# Patient Record
Sex: MALE
[De-identification: names, ages, dates, MRNs, and addresses within clinical notes are randomized per-mention and may not be internally consistent; named-entity substitution may affect disease eponyms.]

---

## 2017-05-22 ENCOUNTER — HOSPITAL ENCOUNTER (EMERGENCY)
Dept: HOSPITAL 31 - C.ER | Age: 57
Discharge: HOME | End: 2017-05-22
Payer: COMMERCIAL

## 2017-05-22 VITALS
OXYGEN SATURATION: 94 % | HEART RATE: 86 BPM | DIASTOLIC BLOOD PRESSURE: 84 MMHG | SYSTOLIC BLOOD PRESSURE: 155 MMHG | RESPIRATION RATE: 18 BRPM

## 2017-05-22 VITALS — TEMPERATURE: 98.1 F
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
## 2017-05-22 DIAGNOSIS — S61.419A: Primary | ICD-10-CM

## 2017-05-22 NOTE — C.PDOC
History Of Present Illness


57 y/o M p/w laceration to hand suffered 4 hours ago. Patient states last 

tetanus within the last 10 years. He states he cleaned it out prior to arrival. 

Accidentally cut himself with a  like blade that was not particularly 

dirty. He states he only wants to make sure it does not get infected and he 

does not want stitches for it. He states he does not care if the scar is larger 

than if it had been sutured. 


Time Seen by Provider: 05/22/17 16:49


Chief Complaint (Nursing): Abnormal Skin Integrity





Past Medical History


Vital Signs: 





 Last Vital Signs











Temp  98.1 F   05/22/17 16:27


 


Pulse  58 L  05/22/17 16:27


 


Resp  20   05/22/17 16:27


 


BP  143/78   05/22/17 16:27


 


Pulse Ox  98   05/22/17 16:27











Family History: States: No Known Family Hx





- Social History


Hx Alcohol Use: Yes


Hx Substance Use: No





- Immunization History


Hx Tetanus Toxoid Vaccination: No (pt unsure)





Review Of Systems


Except As Marked, All Systems Reviewed And Found Negative.


Constitutional: Negative for: Fever


Respiratory: Negative for: Shortness of Breath





Physical Exam





- Physical Exam


Appears: No Acute Distress


Skin: Other (laceration, 2cm to thenar eminence, appears clean, no foreign 

bodies seen, no discharge, no bleeding.)


Extremity: No Tenderness (FROM of hand and digits.)





ED Course And Treatment


O2 Sat by Pulse Oximetry: 98





Medical Decision Making


Medical Decision Making: 


Tetanus up to date. Cleaned by tech in ER. No suturing performed as patient 

refused. Bacitracin topically, gauze, watch for signs of infection and if so, 

taken antibiotics to completion.





Disposition





- Disposition


Referrals: 


Denys Carty MD [Staff Provider] - 


Disposition: HOME/ ROUTINE


Disposition Time: 17:15


Condition: STABLE


Prescriptions: 


Amoxicillin/Clavulanate [Augmentin 875 MG-125 MG] 1 tab PO BID #20 tab


Bacitracin Ointment [Bacitracin] 1 appl TOP TID #1 tube


Instructions:  Laceration Without Closure (ED)





- Clinical Impression


Clinical Impression: 


 Laceration

## 2018-08-11 ENCOUNTER — HOSPITAL ENCOUNTER (EMERGENCY)
Dept: HOSPITAL 31 - C.ER | Age: 58
Discharge: HOME | End: 2018-08-11
Payer: COMMERCIAL

## 2018-08-11 VITALS — TEMPERATURE: 98.7 F | SYSTOLIC BLOOD PRESSURE: 132 MMHG | DIASTOLIC BLOOD PRESSURE: 79 MMHG | HEART RATE: 63 BPM

## 2018-08-11 VITALS — RESPIRATION RATE: 20 BRPM

## 2018-08-11 VITALS — OXYGEN SATURATION: 97 %

## 2018-08-11 DIAGNOSIS — R00.2: Primary | ICD-10-CM

## 2018-08-11 LAB
ALBUMIN SERPL-MCNC: 4.3 G/DL (ref 3.5–5)
ALBUMIN/GLOB SERPL: 1.6 {RATIO} (ref 1–2.1)
ALT SERPL-CCNC: 37 U/L (ref 21–72)
AST SERPL-CCNC: 27 U/L (ref 17–59)
BASOPHILS # BLD AUTO: 0 K/UL (ref 0–0.2)
BASOPHILS NFR BLD: 0.4 % (ref 0–2)
BUN SERPL-MCNC: 23 MG/DL (ref 9–20)
CALCIUM SERPL-MCNC: 9.2 MG/DL (ref 8.6–10.4)
EOSINOPHIL # BLD AUTO: 0.1 K/UL (ref 0–0.7)
EOSINOPHIL NFR BLD: 1.3 % (ref 0–4)
ERYTHROCYTE [DISTWIDTH] IN BLOOD BY AUTOMATED COUNT: 13.7 % (ref 11.5–14.5)
GFR NON-AFRICAN AMERICAN: > 60
HGB BLD-MCNC: 14.3 G/DL (ref 12–18)
LYMPHOCYTES # BLD AUTO: 1.8 K/UL (ref 1–4.3)
LYMPHOCYTES NFR BLD AUTO: 24.9 % (ref 20–40)
MCH RBC QN AUTO: 28.8 PG (ref 27–31)
MCHC RBC AUTO-ENTMCNC: 34 G/DL (ref 33–37)
MCV RBC AUTO: 84.9 FL (ref 80–94)
MONOCYTES # BLD: 0.3 K/UL (ref 0–0.8)
MONOCYTES NFR BLD: 4.6 % (ref 0–10)
NEUTROPHILS # BLD: 5 K/UL (ref 1.8–7)
NEUTROPHILS NFR BLD AUTO: 68.8 % (ref 50–75)
NRBC BLD AUTO-RTO: 0.1 % (ref 0–2)
PLATELET # BLD: 216 K/UL (ref 130–400)
PMV BLD AUTO: 8 FL (ref 7.2–11.7)
RBC # BLD AUTO: 4.94 MIL/UL (ref 4.4–5.9)
WBC # BLD AUTO: 7.2 K/UL (ref 4.8–10.8)

## 2018-08-11 NOTE — RAD
Date of service: 



08/11/2018



HISTORY:

SOB  



COMPARISON:

No prior.



TECHNIQUE:

Chest PA and lateral



FINDINGS:



LUNGS:

No evidence of focal infiltrate or consolidation in the lungs. 

Prominent lung markings are noted bilaterally.



PLEURA:

No significant pleural effusion identified. No pneumothorax apparent.



CARDIOVASCULAR:

Normal.



OSSEOUS STRUCTURES:

No significant abnormalities.



VISUALIZED UPPER ABDOMEN:

Normal.



OTHER FINDINGS:

None.



IMPRESSION:

Prominent lung markings.  No evidence of focal consolidation. No 

evidence of cardiomegaly.

## 2018-08-11 NOTE — C.PDOC
History Of Present Illness


58 y/o male presents to ED for evaluation of palpitations 1 hour ago which has 

resolved now. He described as heart racing. Denies chest pain, shortness of 

breath, or other complaints. 





Time Seen by Provider: 18 09:16


Chief Complaint (Nursing): Palpitations


History Per: Patient


History/Exam Limitations: no limitations


Onset/Duration Of Symptoms: Hrs


Current Symptoms Are (Timing): Gone





Past Medical History


Reviewed: Historical Data, Nursing Documentation, Vital Signs


Vital Signs: 


 Last Vital Signs











Temp  98.7 F   18 11:08


 


Pulse  63   18 11:08


 


Resp  20   18 11:08


 


BP  132/79   18 11:08


 


Pulse Ox  98   18 11:08











Family History: States: Unknown Family Hx





- Social History


Hx Alcohol Use: Yes


Hx Substance Use: No





- Immunization History


Hx Tetanus Toxoid Vaccination: No (pt unsure)





Review Of Systems


Except As Marked, All Systems Reviewed And Found Negative.


Constitutional: Negative for: Fever, Chills


Cardiovascular: Negative for: Chest Pain, Palpitations


Respiratory: Negative for: Cough, Shortness of Breath


Gastrointestinal: Negative for: Nausea, Vomiting





Physical Exam





- Physical Exam


Appears: Non-toxic, No Acute Distress


Skin: Normal Color, Warm, Dry


Head: Atraumatic, Normacephalic


Eye(s): bilateral: Normal Inspection


Oral Mucosa: Moist


Gingiva: Normal Appearing


Chest: Symmetrical, No Tenderness


Cardiovascular: Rhythm Regular, No Murmur


Respiratory: Normal Breath Sounds, No Rales, No Rhonchi, No Wheezing


Gastrointestinal/Abdominal: Soft, No Tenderness


Extremity: Normal ROM


Neurological/Psych: Oriented x3, Normal Speech


Gait: Steady





ED Course And Treatment





- Laboratory Results


Result Diagrams: 


 18 09:30





 18 09:30


ECG: Interpreted By Me, Viewed By Me


ECG Rhythm: Sinus Rhythm


Rate From EC (bpm)


O2 Sat by Pulse Oximetry: 97 (RA)


Pulse Ox Interpretation: Normal





Medical Decision Making


Medical Decision Making: 


Plan:


Blood work


CXR








Disposition





- Disposition


Referrals: 


[facility] [Outside]


[facility] [employer] [facility] [Outside]


Disposition: HOME/ ROUTINE


Disposition Time: 13:00


Condition: STABLE


Additional Instructions: 


Return to ED if any increase symptoms


Follow up with your PMD or clinic for further evaluation


Instructions:  Palpitations


Forms:  [facility] (English)


Print Language: Czech





- POA


Present On Arrival: None





- Clinical Impression


Clinical Impression: 


 Palpitations








- PA / NP / Resident Statement


MD/DO has reviewed & agrees with the documentation as recorded.





- Scribe Statement


The provider has reviewed the documentation as recorded by the Scribe





KP





All medical record entries made by the Scribe were at my direction and 

personally dictated by me. I have reviewed the chart and agree that the record 

accurately reflects my personal performance of the history, physical exam, 

medical decision making, and the department course for this patient. I have 

also personally directed, reviewed, and agree with the discharge instructions 

and disposition.